# Patient Record
Sex: FEMALE | Race: WHITE | HISPANIC OR LATINO
[De-identification: names, ages, dates, MRNs, and addresses within clinical notes are randomized per-mention and may not be internally consistent; named-entity substitution may affect disease eponyms.]

---

## 2018-10-31 ENCOUNTER — LAB SERVICES (OUTPATIENT)
Dept: OTHER | Age: 23
End: 2018-10-31

## 2018-10-31 ENCOUNTER — CHARTING TRANS (OUTPATIENT)
Dept: OTHER | Age: 23
End: 2018-10-31

## 2018-10-31 LAB
MONO TEST: NEGATIVE
RAPID STREP GROUP A: NORMAL

## 2018-10-31 ASSESSMENT — PAIN SCALES - GENERAL: PAINLEVEL_OUTOF10: 8

## 2018-11-04 LAB — RESPIRATORY CULTURE (RTC) HL: NORMAL

## 2018-12-08 VITALS
HEIGHT: 63 IN | HEART RATE: 78 BPM | RESPIRATION RATE: 18 BRPM | DIASTOLIC BLOOD PRESSURE: 80 MMHG | SYSTOLIC BLOOD PRESSURE: 110 MMHG | TEMPERATURE: 98.6 F | WEIGHT: 130 LBS | BODY MASS INDEX: 23.04 KG/M2

## 2021-11-16 ENCOUNTER — WALK IN (OUTPATIENT)
Dept: URGENT CARE | Age: 26
End: 2021-11-16

## 2021-11-16 VITALS
BODY MASS INDEX: 23.04 KG/M2 | HEIGHT: 63 IN | HEART RATE: 106 BPM | SYSTOLIC BLOOD PRESSURE: 120 MMHG | TEMPERATURE: 98.4 F | RESPIRATION RATE: 16 BRPM | DIASTOLIC BLOOD PRESSURE: 60 MMHG | WEIGHT: 130 LBS | OXYGEN SATURATION: 97 %

## 2021-11-16 DIAGNOSIS — J02.9 SORE THROAT: Primary | ICD-10-CM

## 2021-11-16 PROCEDURE — 99212 OFFICE O/P EST SF 10 MIN: CPT | Performed by: NURSE PRACTITIONER

## 2021-11-16 PROCEDURE — U0005 INFEC AGEN DETEC AMPLI PROBE: HCPCS | Performed by: PSYCHIATRY & NEUROLOGY

## 2021-11-16 PROCEDURE — U0003 INFECTIOUS AGENT DETECTION BY NUCLEIC ACID (DNA OR RNA); SEVERE ACUTE RESPIRATORY SYNDROME CORONAVIRUS 2 (SARS-COV-2) (CORONAVIRUS DISEASE [COVID-19]), AMPLIFIED PROBE TECHNIQUE, MAKING USE OF HIGH THROUGHPUT TECHNOLOGIES AS DESCRIBED BY CMS-2020-01-R: HCPCS | Performed by: PSYCHIATRY & NEUROLOGY

## 2021-11-16 ASSESSMENT — ENCOUNTER SYMPTOMS
SORE THROAT: 1
CONSTITUTIONAL NEGATIVE: 1
ALLERGIC/IMMUNOLOGIC NEGATIVE: 1
PSYCHIATRIC NEGATIVE: 1
GASTROINTESTINAL NEGATIVE: 1
NEUROLOGICAL NEGATIVE: 1
HEMATOLOGIC/LYMPHATIC NEGATIVE: 1
EYES NEGATIVE: 1
COUGH: 0
ENDOCRINE NEGATIVE: 1

## 2021-11-17 LAB
SARS-COV-2 RNA RESP QL NAA+PROBE: NOT DETECTED
SERVICE CMNT-IMP: NORMAL
SERVICE CMNT-IMP: NORMAL

## 2022-10-31 ENCOUNTER — OFFICE VISIT (OUTPATIENT)
Dept: FAMILY MEDICINE CLINIC | Facility: CLINIC | Age: 27
End: 2022-10-31
Payer: COMMERCIAL

## 2022-10-31 VITALS
OXYGEN SATURATION: 98 % | TEMPERATURE: 99 F | BODY MASS INDEX: 29.75 KG/M2 | WEIGHT: 170 LBS | RESPIRATION RATE: 16 BRPM | HEIGHT: 63.2 IN | SYSTOLIC BLOOD PRESSURE: 111 MMHG | DIASTOLIC BLOOD PRESSURE: 65 MMHG | HEART RATE: 70 BPM

## 2022-10-31 DIAGNOSIS — J02.9 SORE THROAT: Primary | ICD-10-CM

## 2022-10-31 DIAGNOSIS — R06.02 SHORTNESS OF BREATH: ICD-10-CM

## 2022-10-31 LAB
CONTROL LINE PRESENT WITH A CLEAR BACKGROUND (YES/NO): YES YES/NO
KIT LOT #: NORMAL NUMERIC
STREP GRP A CUL-SCR: NEGATIVE

## 2022-10-31 RX ORDER — FLUOXETINE 10 MG/1
10 CAPSULE ORAL DAILY
COMMUNITY
Start: 2022-06-10 | End: 2022-10-31 | Stop reason: ALTCHOICE

## 2022-10-31 RX ORDER — DEXTROAMPHETAMINE SACCHARATE, AMPHETAMINE ASPARTATE, DEXTROAMPHETAMINE SULFATE AND AMPHETAMINE SULFATE 1.25; 1.25; 1.25; 1.25 MG/1; MG/1; MG/1; MG/1
1 TABLET ORAL DAILY
COMMUNITY
Start: 2022-09-21

## 2022-10-31 RX ORDER — MORPHINE SULFATE 15 MG/1
TABLET ORAL
COMMUNITY
End: 2022-10-31 | Stop reason: ALTCHOICE

## 2022-10-31 RX ORDER — NAPROXEN SODIUM 550 MG/1
TABLET ORAL
COMMUNITY
End: 2022-10-31 | Stop reason: ALTCHOICE

## 2022-10-31 RX ORDER — DOXYCYCLINE 100 MG/1
CAPSULE ORAL
COMMUNITY
End: 2022-10-31 | Stop reason: ALTCHOICE

## 2022-10-31 RX ORDER — DOCUSATE SODIUM 100 MG/1
CAPSULE, LIQUID FILLED ORAL
COMMUNITY
End: 2022-10-31 | Stop reason: ALTCHOICE

## 2022-10-31 RX ORDER — METRONIDAZOLE 500 MG/1
TABLET ORAL
COMMUNITY
End: 2022-10-31 | Stop reason: ALTCHOICE

## 2022-10-31 RX ORDER — FLUOXETINE HYDROCHLORIDE 20 MG/1
20 CAPSULE ORAL DAILY
COMMUNITY
Start: 2022-09-19 | End: 2022-10-31 | Stop reason: ALTCHOICE

## 2022-10-31 RX ORDER — DEXTROAMPHETAMINE SACCHARATE, AMPHETAMINE ASPARTATE, DEXTROAMPHETAMINE SULFATE AND AMPHETAMINE SULFATE 1.25; 1.25; 1.25; 1.25 MG/1; MG/1; MG/1; MG/1
TABLET ORAL
COMMUNITY
End: 2022-10-31

## 2022-10-31 RX ORDER — FLUOXETINE HYDROCHLORIDE 20 MG/1
CAPSULE ORAL
COMMUNITY
End: 2022-10-31 | Stop reason: ALTCHOICE

## 2022-10-31 RX ORDER — TRAMADOL HYDROCHLORIDE 50 MG/1
TABLET ORAL
COMMUNITY
End: 2022-10-31 | Stop reason: ALTCHOICE

## 2022-10-31 RX ORDER — HYDROCODONE BITARTRATE AND ACETAMINOPHEN 5; 325 MG/1; MG/1
TABLET ORAL AS DIRECTED
COMMUNITY
End: 2022-10-31 | Stop reason: ALTCHOICE

## 2022-10-31 RX ORDER — GABAPENTIN 100 MG/1
100 CAPSULE ORAL 3 TIMES DAILY PRN
COMMUNITY
Start: 2022-06-15

## 2022-10-31 RX ORDER — ONDANSETRON 4 MG/1
4 TABLET, ORALLY DISINTEGRATING ORAL AS DIRECTED
COMMUNITY
Start: 2020-09-30 | End: 2022-10-31 | Stop reason: ALTCHOICE

## 2022-10-31 RX ORDER — INFLUENZA A VIRUS A/SINGAPORE/GP1908/2015 IVR-180 (H1N1) ANTIGEN (MDCK CELL DERIVED, PROPIOLACTONE INACTIVATED), INFLUENZA A VIRUS A/NORTH CAROLINA/04/2016 (H3N2) HEMAGGLUTININ ANTIGEN (MDCK CELL DERIVED, PROPIOLACTONE INACTIVATED), INFLUENZA B VIRUS B/IOWA/06/2017 HEMAGGLUTININ ANTIGEN (MDCK CELL DERIVED, PROPIOLACTONE INACTIVATED), INFLUENZA B VIRUS B/SINGAPORE/INFTT-16-0610/2016 HEMAGGLUTININ ANTIGEN (MDCK CELL DERIVED, PROPIOLACTONE INACTIVATED) 15; 15; 15; 15 UG/.5ML; UG/.5ML; UG/.5ML; UG/.5ML
INJECTION, SUSPENSION INTRAMUSCULAR
COMMUNITY
End: 2022-10-31 | Stop reason: ALTCHOICE

## 2022-10-31 RX ORDER — IBUPROFEN 600 MG/1
600 TABLET ORAL AS DIRECTED
COMMUNITY
End: 2022-10-31 | Stop reason: ALTCHOICE

## 2022-10-31 RX ORDER — GABAPENTIN 100 MG/1
CAPSULE ORAL
COMMUNITY
End: 2022-10-31

## 2022-10-31 RX ORDER — ALBUTEROL SULFATE 90 UG/1
2 AEROSOL, METERED RESPIRATORY (INHALATION) EVERY 4 HOURS PRN
Qty: 18 G | Refills: 0 | Status: SHIPPED | OUTPATIENT
Start: 2022-10-31

## 2022-10-31 RX ORDER — POTASSIUM CHLORIDE 20 MEQ/1
TABLET, EXTENDED RELEASE ORAL
COMMUNITY
End: 2022-10-31 | Stop reason: ALTCHOICE

## 2022-10-31 RX ORDER — INFLUENZA A VIRUS A/MICHIGAN/45/2015 X-275 (H1N1) ANTIGEN (FORMALDEHYDE INACTIVATED), INFLUENZA A VIRUS A/SINGAPORE/INFIMH-16-0019/2016 IVR-186 (H3N2) ANTIGEN (FORMALDEHYDE INACTIVATED), INFLUENZA B VIRUS B/PHUKET/3073/2013 ANTIGEN (FORMALDEHYDE INACTIVATED), AND INFLUENZA B VIRUS B/MARYLAND/15/2016 BX-69A ANTIGEN (FORMALDEHYDE INACTIVATED) 15; 15; 15; 15 UG/.5ML; UG/.5ML; UG/.5ML; UG/.5ML
INJECTION, SUSPENSION INTRAMUSCULAR
COMMUNITY
End: 2022-10-31 | Stop reason: ALTCHOICE

## 2022-10-31 RX ORDER — FLUOXETINE HYDROCHLORIDE 40 MG/1
40 CAPSULE ORAL DAILY
COMMUNITY
Start: 2022-10-30

## 2022-11-01 ENCOUNTER — OFFICE VISIT (OUTPATIENT)
Dept: FAMILY MEDICINE CLINIC | Facility: CLINIC | Age: 27
End: 2022-11-01
Payer: COMMERCIAL

## 2022-11-01 VITALS
HEART RATE: 77 BPM | WEIGHT: 170 LBS | DIASTOLIC BLOOD PRESSURE: 83 MMHG | TEMPERATURE: 99 F | BODY MASS INDEX: 29.75 KG/M2 | SYSTOLIC BLOOD PRESSURE: 135 MMHG | HEIGHT: 63.19 IN

## 2022-11-01 DIAGNOSIS — Z00.00 ENCOUNTER FOR ANNUAL HEALTH EXAMINATION: Primary | ICD-10-CM

## 2022-11-01 DIAGNOSIS — F41.1 GAD (GENERALIZED ANXIETY DISORDER): ICD-10-CM

## 2022-11-01 DIAGNOSIS — F32.A DEPRESSION, UNSPECIFIED DEPRESSION TYPE: ICD-10-CM

## 2022-11-01 DIAGNOSIS — F90.9 ATTENTION DEFICIT HYPERACTIVITY DISORDER (ADHD), UNSPECIFIED ADHD TYPE: ICD-10-CM

## 2022-11-01 PROBLEM — J02.9 SORE THROAT: Status: RESOLVED | Noted: 2022-10-31 | Resolved: 2022-11-01

## 2022-11-01 PROBLEM — R06.02 SHORTNESS OF BREATH: Status: RESOLVED | Noted: 2022-10-31 | Resolved: 2022-11-01

## 2022-11-01 PROCEDURE — 3079F DIAST BP 80-89 MM HG: CPT | Performed by: FAMILY MEDICINE

## 2022-11-01 PROCEDURE — 3008F BODY MASS INDEX DOCD: CPT | Performed by: FAMILY MEDICINE

## 2022-11-01 PROCEDURE — 3075F SYST BP GE 130 - 139MM HG: CPT | Performed by: FAMILY MEDICINE

## 2022-11-01 PROCEDURE — 99385 PREV VISIT NEW AGE 18-39: CPT | Performed by: FAMILY MEDICINE

## 2022-11-01 RX ORDER — ACETAMINOPHEN 325 MG/1
325 TABLET ORAL EVERY 6 HOURS PRN
COMMUNITY

## 2022-11-01 RX ORDER — IBUPROFEN 600 MG/1
600 TABLET ORAL EVERY 6 HOURS PRN
COMMUNITY

## 2022-11-03 ENCOUNTER — PATIENT MESSAGE (OUTPATIENT)
Dept: FAMILY MEDICINE CLINIC | Facility: CLINIC | Age: 27
End: 2022-11-03

## 2022-11-04 ENCOUNTER — TELEPHONE (OUTPATIENT)
Dept: FAMILY MEDICINE CLINIC | Facility: CLINIC | Age: 27
End: 2022-11-04

## 2022-11-04 NOTE — TELEPHONE ENCOUNTER
Patient called to see if medication can be sent for worsening sinus symptoms. Patient sent [employer] message below and called. Patient states she is having sinus pressure, pain in front of head in sinuses, post nasal drip and worsening cough. Patient has been taking over the counter cough and cold medication without relief.

## 2022-11-04 NOTE — TELEPHONE ENCOUNTER
Please add patient to my scheduled tomorrow at 11:40 a.m. Go to immediate care for worsening symptoms.

## 2022-11-04 NOTE — TELEPHONE ENCOUNTER
Spoke to patient. Scheduled appointment.     Future Appointments   Date Time Provider Dickson Irina   11/5/2022 11:40 AM KAROLINA No [facility]   11/8/2022  3:00 PM Sabrina Garcia MD [facility] [place]

## 2022-11-08 ENCOUNTER — TELEPHONE (OUTPATIENT)
Dept: FAMILY MEDICINE CLINIC | Facility: CLINIC | Age: 27
End: 2022-11-08

## 2022-11-08 ENCOUNTER — TELEMEDICINE (OUTPATIENT)
Dept: FAMILY MEDICINE CLINIC | Facility: CLINIC | Age: 27
End: 2022-11-08

## 2022-11-08 DIAGNOSIS — F90.9 ATTENTION DEFICIT HYPERACTIVITY DISORDER (ADHD), UNSPECIFIED ADHD TYPE: ICD-10-CM

## 2022-11-08 DIAGNOSIS — F41.1 GAD (GENERALIZED ANXIETY DISORDER): ICD-10-CM

## 2022-11-08 DIAGNOSIS — N92.6 IRREGULAR MENSTRUAL CYCLE: Primary | ICD-10-CM

## 2022-11-08 PROCEDURE — 99214 OFFICE O/P EST MOD 30 MIN: CPT | Performed by: FAMILY MEDICINE

## 2022-11-08 RX ORDER — DEXTROAMPHETAMINE SACCHARATE, AMPHETAMINE ASPARTATE MONOHYDRATE, DEXTROAMPHETAMINE SULFATE AND AMPHETAMINE SULFATE 1.25; 1.25; 1.25; 1.25 MG/1; MG/1; MG/1; MG/1
5 CAPSULE, EXTENDED RELEASE ORAL EVERY MORNING
Qty: 30 CAPSULE | Refills: 0 | Status: SHIPPED | OUTPATIENT
Start: 2022-11-08 | End: 2022-12-08

## 2022-11-08 RX ORDER — GABAPENTIN 100 MG/1
100 CAPSULE ORAL 3 TIMES DAILY PRN
Qty: 90 CAPSULE | Refills: 0 | Status: SHIPPED | OUTPATIENT
Start: 2022-11-08

## 2022-11-08 NOTE — PROGRESS NOTES
Virtual Telephone Check-In    Velia Hobbs verbally consents to a Virtual/Telephone Check-In service on 11/08/22. Patient understands and accepts financial responsibility for any deductible, co-insurance and/or co-pays associated with this service. Duration of the service: 15 minutes  This telemedicine visit was conducted using live audio and video. Summary of topics discussed:     Patient states that she did lab work at [facility] today. States her psychologist/mental health provider was asking for testosterone and estrogen lab work. Has been taking fluoxetine and recently had dose increased by that provider but it has not been helping. Has tried Wellbutrin in the past which she felt did help. Takes gabapentin sometimes only once a day for anxiety which really helps. Was also diagnosed with ADHD and takes Adderall, states that the 5 mg tablet does not last all day but her provider does not recommend increasing the dose. She is amenable to trying extended release. Physical Exam:  General: alert, speaking in full sentences, no acute distress  Lungs: respirations sound unlabored, no audible wheezing with speaking. Neurologic: alert, oriented x3    Assessment and plan:    1. Irregular menstrual cycle  - TESTOSTERONE (FREE)    2. Attention deficit hyperactivity disorder (ADHD), unspecified ADHD type  [place]. Appropriate use discussed with patient. - amphetamine-dextroamphetamine ER (ADDERALL XR) 5 MG Oral Capsule SR 24 Hr; Take 1 capsule (5 mg total) by mouth every morning. Dispense: 30 capsule; Refill: 0    3. VENANCIO (generalized anxiety disorder)  - takes for anxiety sometimes only every day prn.  - gabapentin 100 MG Oral Cap; Take 1 capsule (100 mg total) by mouth 3 (three) times daily as needed. Dispense: 90 capsule; Refill: 0      Advised to call back clinic if no improvement in symptoms. Red flags discussed to go to ER.      Osmar Alexander MD

## 2022-11-08 NOTE — TELEPHONE ENCOUNTER
Please call [facility] in [place] on 11/9 and request that the Free Testosterone lab I ordered today be added on to the labs she did earlier today which included a total testosterone. We tried calling today but they close at 3p. Thank you.

## 2022-11-09 NOTE — TELEPHONE ENCOUNTER
Spoke with [facility] and they do not have the necessary specimen/vial. Patient would have to be redrawn.

## 2022-11-10 LAB
ABSOLUTE BASOPHILS: 32 CELLS/UL (ref 0–200)
ABSOLUTE EOSINOPHILS: 70 CELLS/UL (ref 15–500)
ABSOLUTE LYMPHOCYTES: 1534 CELLS/UL (ref 850–3900)
ABSOLUTE MONOCYTES: 583 CELLS/UL (ref 200–950)
ABSOLUTE NEUTROPHILS: 3181 CELLS/UL (ref 1500–7800)
ALBUMIN/GLOBULIN RATIO: 1.3 (CALC) (ref 1–2.5)
ALBUMIN: 4.4 G/DL (ref 3.6–5.1)
ALKALINE PHOSPHATASE: 76 U/L (ref 31–125)
ALT: 70 U/L (ref 6–29)
AST: 36 U/L (ref 10–30)
BASOPHILS: 0.6 %
BILIRUBIN, TOTAL: 0.6 MG/DL (ref 0.2–1.2)
BUN: 11 MG/DL (ref 7–25)
CALCIUM: 9 MG/DL (ref 8.6–10.2)
CARBON DIOXIDE: 25 MMOL/L (ref 20–32)
CHLORIDE: 102 MMOL/L (ref 98–110)
CHOL/HDLC RATIO: 2.6 (CALC)
CHOLESTEROL, TOTAL: 203 MG/DL
CREATININE: 0.61 MG/DL (ref 0.5–0.96)
EGFR: 126 ML/MIN/1.73M2
EOSINOPHILS: 1.3 %
ESTRADIOL: 38 PG/ML
GLOBULIN: 3.4 G/DL (CALC) (ref 1.9–3.7)
GLUCOSE: 88 MG/DL (ref 65–99)
HDL CHOLESTEROL: 79 MG/DL
HEMATOCRIT: 39.8 % (ref 35–45)
HEMOGLOBIN: 13.6 G/DL (ref 11.7–15.5)
LDL-CHOLESTEROL: 107 MG/DL (CALC)
LYMPHOCYTES: 28.4 %
MCH: 32.5 PG (ref 27–33)
MCHC: 34.2 G/DL (ref 32–36)
MCV: 95.2 FL (ref 80–100)
MONOCYTES: 10.8 %
MPV: 10.8 FL (ref 7.5–12.5)
NEUTROPHILS: 58.9 %
NON-HDL CHOLESTEROL: 124 MG/DL (CALC)
PLATELET COUNT: 253 THOUSAND/UL (ref 140–400)
POTASSIUM: 4 MMOL/L (ref 3.5–5.3)
PROTEIN, TOTAL: 7.8 G/DL (ref 6.1–8.1)
RDW: 11.7 % (ref 11–15)
RED BLOOD CELL COUNT: 4.18 MILLION/UL (ref 3.8–5.1)
SODIUM: 136 MMOL/L (ref 135–146)
T3, TOTAL: 122 NG/DL (ref 76–181)
T4 (THYROXINE), TOTAL: 8.4 MCG/DL (ref 5.1–11.9)
TESTOSTERONE, TOTAL,$/MS/MS: 34 NG/DL (ref 2–45)
THYROID PEROXIDASE$ANTIBODIES: 4 IU/ML
TRIGLYCERIDES: 77 MG/DL
TSH W/REFLEX TO FT4: 0.73 MIU/L
WHITE BLOOD CELL COUNT: 5.4 THOUSAND/UL (ref 3.8–10.8)

## 2022-11-10 NOTE — TELEPHONE ENCOUNTER
Noted, [employer] message sent to pt to inform. I don't want it - it was for another provider who wanted to check her free and total testosterone.

## 2022-11-11 ENCOUNTER — PATIENT MESSAGE (OUTPATIENT)
Dept: FAMILY MEDICINE CLINIC | Facility: CLINIC | Age: 27
End: 2022-11-11

## 2022-11-17 ENCOUNTER — PATIENT MESSAGE (OUTPATIENT)
Dept: FAMILY MEDICINE CLINIC | Facility: CLINIC | Age: 27
End: 2022-11-17

## 2022-11-18 NOTE — TELEPHONE ENCOUNTER
From: Asim Duckworth  Sent: 11/17/2022 9:16 AM CST  To: Em Rn Triage  Subject: Question regarding COMP METABOLIC PANEL (14)    Hello, thank you for getting back to me regarding blood work. Your advice will be something I prioritize. As far as the new Adderall XR you have prescribed to me, it seems to keep me up later than normal, and wake up throughout the night. I am having difficulty with sleep. If possible, can that be removed from my list of meds? Let me know what you recommend.      Thank you,  Amarilis Nunez

## 2023-04-12 ENCOUNTER — PATIENT MESSAGE (OUTPATIENT)
Dept: FAMILY MEDICINE CLINIC | Facility: CLINIC | Age: 28
End: 2023-04-12

## 2023-04-12 DIAGNOSIS — F90.9 ATTENTION DEFICIT HYPERACTIVITY DISORDER (ADHD), UNSPECIFIED ADHD TYPE: ICD-10-CM

## 2023-04-12 NOTE — TELEPHONE ENCOUNTER
Patient requesting refills for Adderall 5 mg (per [employer] message). Adderall 10 mg was previously sent due to supply issues for 5 mg tabs.

## 2023-04-12 NOTE — TELEPHONE ENCOUNTER
Dr. Hoang Kumar, please see patient's message and advise on which labs you would like repeated based on results from 11/8/22. Only CMP and Lipid panel? Refill encounter initiated for Adderall 5 mg.

## 2023-04-12 NOTE — TELEPHONE ENCOUNTER
Dr. Elizabeth Deleon, please also advise if ok to fill Bupropion 300 XL for patient as she states she is no longer seeing Dr. Harriet Olszewski, previous prescriber.

## 2023-04-12 NOTE — TELEPHONE ENCOUNTER
From: Kurt Retana  To: Stacey Cook MD  Sent: 4/12/2023 9:42 AM CDT  Subject: Medication list    Hi Dr Lizzy Sauceda, can you check to see if I am due for a follow up visit on labs or any tests? I am almost out of my medication Bupropion 300XL and adderall 5.  Thank you

## 2023-04-12 NOTE — TELEPHONE ENCOUNTER
Please review. Protocol failed / No protocol. Patient originally on Adderall 5 mg but was getting 10 mg due to supply issues with 5-mg dose at that time. Please see pended Rx for review and approval.     Requested Prescriptions   Pending Prescriptions Disp Refills    amphetamine-dextroamphetamine 5 MG Oral Tab 60 tablet 0     Sig: Take 1 tablet by mouth 2 (two) times daily.        There is no refill protocol information for this order          Recent Outpatient Visits              5 months ago Irregular menstrual cycle    [facility], [address], Elen Sanchez MD    Telemedicine    5 months ago Encounter for annual health examination    [address], [address] [address], Elen Sanchez MD    Office Visit    5 months ago Sore throat    [address], [address] [place], APRN    Office Visit

## 2023-04-13 RX ORDER — DEXTROAMPHETAMINE SACCHARATE, AMPHETAMINE ASPARTATE, DEXTROAMPHETAMINE SULFATE AND AMPHETAMINE SULFATE 1.25; 1.25; 1.25; 1.25 MG/1; MG/1; MG/1; MG/1
1 TABLET ORAL 2 TIMES DAILY
Qty: 60 TABLET | Refills: 0 | OUTPATIENT
Start: 2023-04-13

## 2023-04-13 NOTE — TELEPHONE ENCOUNTER
Please help her schedule video visit today for medication management so I can get her set up for the next 3 months.  Thank you

## 2023-10-27 ENCOUNTER — PATIENT MESSAGE (OUTPATIENT)
Dept: FAMILY MEDICINE CLINIC | Facility: CLINIC | Age: 28
End: 2023-10-27

## 2023-10-28 NOTE — TELEPHONE ENCOUNTER
2. Attention deficit hyperactivity disorder (ADHD), unspecified ADHD type  [place]. Appropriate use discussed with patient. - amphetamine-dextroamphetamine 5 MG Oral Tab; Take 1 tablet (5 mg total) by mouth daily. Dispense: 30 tablet; Refill: 0  - amphetamine-dextroamphetamine 5 MG Oral Tab; Take 1 tablet (5 mg total) by mouth daily. Dispense: 30 tablet; Refill: 0  - amphetamine-dextroamphetamine 5 MG Oral Tab; Take 1 tablet (5 mg total) by mouth daily. Dispense: 30 tablet;  Refill: 0